# Patient Record
Sex: FEMALE | Race: WHITE | HISPANIC OR LATINO | URBAN - METROPOLITAN AREA
[De-identification: names, ages, dates, MRNs, and addresses within clinical notes are randomized per-mention and may not be internally consistent; named-entity substitution may affect disease eponyms.]

---

## 2019-05-01 ENCOUNTER — IMPORTED ENCOUNTER (OUTPATIENT)
Dept: URBAN - METROPOLITAN AREA CLINIC 50 | Facility: CLINIC | Age: 54
End: 2019-05-01

## 2020-09-10 ENCOUNTER — IMPORTED ENCOUNTER (OUTPATIENT)
Dept: URBAN - METROPOLITAN AREA CLINIC 50 | Facility: CLINIC | Age: 55
End: 2020-09-10

## 2020-10-23 ENCOUNTER — IMPORTED ENCOUNTER (OUTPATIENT)
Dept: URBAN - METROPOLITAN AREA CLINIC 50 | Facility: CLINIC | Age: 55
End: 2020-10-23

## 2021-04-17 ASSESSMENT — VISUAL ACUITY
OS_CC: J1+
OD_CC: J1+
OS_CC: 20/20
OD_CC: 20/20

## 2021-04-17 ASSESSMENT — TONOMETRY
OS_IOP_MMHG: 17
OD_IOP_MMHG: 18

## 2021-10-12 ENCOUNTER — PREPPED CHART (OUTPATIENT)
Dept: URBAN - METROPOLITAN AREA CLINIC 53 | Facility: CLINIC | Age: 56
End: 2021-10-12

## 2021-12-30 ENCOUNTER — COMPREHENSIVE EXAM (OUTPATIENT)
Dept: URBAN - METROPOLITAN AREA CLINIC 53 | Facility: CLINIC | Age: 56
End: 2021-12-30

## 2021-12-30 DIAGNOSIS — H25.13: ICD-10-CM

## 2021-12-30 DIAGNOSIS — H35.371: ICD-10-CM

## 2021-12-30 PROCEDURE — 92015 DETERMINE REFRACTIVE STATE: CPT

## 2021-12-30 PROCEDURE — 92014 COMPRE OPH EXAM EST PT 1/>: CPT

## 2021-12-30 ASSESSMENT — KERATOMETRY
OD_K2POWER_DIOPTERS: 46.25
OD_AXISANGLE2_DEGREES: 80
OD_AXISANGLE_DEGREES: 170
OS_AXISANGLE2_DEGREES: 94
OD_K1POWER_DIOPTERS: 46.00
OS_K2POWER_DIOPTERS: 46.50
OS_K1POWER_DIOPTERS: 46.25
OS_AXISANGLE_DEGREES: 4

## 2021-12-30 ASSESSMENT — VISUAL ACUITY
OS_GLARE: 20/20
OS_CC: 20/20
OU_CC: J1+
OD_CC: 20/20
OD_GLARE: 20/20

## 2021-12-30 ASSESSMENT — TONOMETRY
OD_IOP_MMHG: 13
OS_IOP_MMHG: 13

## 2022-03-24 NOTE — PATIENT DISCUSSION
Recommended retinal evaluation - gave patient contact info for retinal group in [facility] since closer to where he lives.

## 2023-01-27 ENCOUNTER — COMPREHENSIVE EXAM (OUTPATIENT)
Dept: URBAN - METROPOLITAN AREA CLINIC 53 | Facility: CLINIC | Age: 58
End: 2023-01-27

## 2023-01-27 DIAGNOSIS — H35.371: ICD-10-CM

## 2023-01-27 DIAGNOSIS — H25.13: ICD-10-CM

## 2023-01-27 PROCEDURE — 92134 CPTRZ OPH DX IMG PST SGM RTA: CPT

## 2023-01-27 PROCEDURE — 92014 COMPRE OPH EXAM EST PT 1/>: CPT

## 2023-01-27 ASSESSMENT — VISUAL ACUITY
OD_CC: 20/20
OS_GLARE: 20/25
OU_CC: J1+
OS_CC: 20/20
OD_GLARE: 20/25

## 2023-01-27 ASSESSMENT — KERATOMETRY
OS_K2POWER_DIOPTERS: 46.50
OD_K2POWER_DIOPTERS: 46.25
OS_K1POWER_DIOPTERS: 46.25
OD_AXISANGLE2_DEGREES: 80
OD_AXISANGLE_DEGREES: 170
OS_AXISANGLE_DEGREES: 4
OS_AXISANGLE2_DEGREES: 94
OD_K1POWER_DIOPTERS: 46.00

## 2023-01-27 ASSESSMENT — TONOMETRY
OS_IOP_MMHG: 14
OD_IOP_MMHG: 13

## 2024-02-02 ENCOUNTER — COMPREHENSIVE EXAM (OUTPATIENT)
Dept: URBAN - METROPOLITAN AREA CLINIC 53 | Facility: CLINIC | Age: 59
End: 2024-02-02

## 2024-02-02 DIAGNOSIS — H25.13: ICD-10-CM

## 2024-02-02 DIAGNOSIS — H35.371: ICD-10-CM

## 2024-02-02 PROCEDURE — 92014 COMPRE OPH EXAM EST PT 1/>: CPT

## 2024-02-02 PROCEDURE — 92134 CPTRZ OPH DX IMG PST SGM RTA: CPT

## 2024-02-02 ASSESSMENT — KERATOMETRY
OS_AXISANGLE_DEGREES: 4
OS_AXISANGLE2_DEGREES: 94
OD_AXISANGLE_DEGREES: 170
OD_K2POWER_DIOPTERS: 46.25
OS_K2POWER_DIOPTERS: 46.50
OS_K1POWER_DIOPTERS: 46.25
OD_K1POWER_DIOPTERS: 46.00
OD_AXISANGLE2_DEGREES: 80

## 2024-02-02 ASSESSMENT — VISUAL ACUITY
OD_CC: 20/20
OD_GLARE: 20/20
OS_GLARE: 20/25
OU_CC: J1+@14"
OS_CC: 20/20-2
OS_GLARE: 20/20
OD_GLARE: 20/20

## 2024-02-02 ASSESSMENT — TONOMETRY
OS_IOP_MMHG: 13
OD_IOP_MMHG: 13

## 2024-06-17 NOTE — PATIENT DISCUSSION
Recommended yearly dilated eye examinations. Subjective:   Patient ID: Lucy Wood is a 64 year old female.    Patient presents for follow-up on blood pressure which has been low.  Also knee pain and issues as below.        History/Other:   Review of Systems  Current Outpatient Medications   Medication Sig Dispense Refill    atorvastatin 40 MG Oral Tab Take 1 tablet (40 mg total) by mouth nightly. 90 tablet 3    aspirin 81 MG Oral Tab EC Take 1 tablet (81 mg total) by mouth daily.      diazePAM 5 MG Oral Tab Take 1 tablet (5 mg total) by mouth every 6 (six) hours as needed for Anxiety. 20 tablet 0    escitalopram (LEXAPRO) 5 MG Oral Tab Take 1 tablet (5 mg total) by mouth daily. 90 tablet 1    Budesonide (PULMICORT FLEXHALER) 180 MCG/ACT Inhalation Aerosol Powder, Breath Activated Inhale 1 puff into the lungs 2 (two) times daily. 3 each 3    clotrimazole-betamethasone 1-0.05 % External Cream Apply 1 Application topically 2 (two) times daily as needed. 60 g 1     Allergies:No Known Allergies    Objective:   Physical Exam  Constitutional:       Appearance: Normal appearance.   HENT:      Head:      Comments: Bilateral TMJ crepitus  Cardiovascular:      Rate and Rhythm: Normal rate and regular rhythm.      Pulses: Normal pulses.      Heart sounds: Normal heart sounds.   Pulmonary:      Effort: Pulmonary effort is normal.      Breath sounds: Normal breath sounds.   Musculoskeletal:      Right lower leg: No edema.      Left lower leg: No edema.      Comments: Knees with bilateral mild valgus deformity.  Patellofemoral crepitus.  Small effusions bilaterally.   Neurological:      Mental Status: She is alert.         Assessment & Plan:   1. Essential hypertension-blood pressure continues to be low with lisinopril 2.5 mg daily.  History of CVA 7 months ago.  Plan to discontinue lisinopril.   will contact me in 1 month with home blood pressures.  Has follow-up with neurosurgery regarding small aneurysm in 8/2024.  Follow-up with me in 3 months.   2. TMJ  syndrome-discussed soft foods, ibuprofen as needed, nightguard   3. Patellofemoral stress syndrome, unspecified laterality-left knee pain especially after dancing at a wedding.  Exam consistent with patellofemoral syndrome.  Exercises given.   4. Adjustment disorder with mixed anxiety and depressed mood-frequently tearful and frustrated with difficulty with short-term memory.  Tried Lexapro but she told her  it made her feel funny.  Discussed that side effects usually resolve after 1 to 2 weeks.  Plan to try once again 2.5 mg Lexapro daily for 1 week then 5 mg daily.       No orders of the defined types were placed in this encounter.      Meds This Visit:  Requested Prescriptions      No prescriptions requested or ordered in this encounter       Imaging & Referrals:  None

## 2025-03-21 ENCOUNTER — COMPREHENSIVE EXAM (OUTPATIENT)
Age: 60
End: 2025-03-21

## 2025-03-21 DIAGNOSIS — H35.371: ICD-10-CM

## 2025-03-21 DIAGNOSIS — H25.13: ICD-10-CM

## 2025-03-21 PROCEDURE — 99214 OFFICE O/P EST MOD 30 MIN: CPT

## 2025-03-21 PROCEDURE — 92134 CPTRZ OPH DX IMG PST SGM RTA: CPT
